# Patient Record
(demographics unavailable — no encounter records)

---

## 2025-07-02 NOTE — END OF VISIT
[FreeTextEntry3] : I, Tess Rowe, acted as a scribe on behalf of Dr. Nafisa Ji M.D. on 07/02/2025.   All medical entries made by the scribe were at my Dr. Nafisa iJ M.D. direction and personally dictated by me on 07/02/2025. I have reviewed the chart and agree that the record accurately reflects my personal performance of the history, physical exam, assessment and plan. I have also personally directed, reviewed, and agreed with the chart.  You can access the FollowMyHealth Patient Portal offered by [facility] by registering at the following website: [email or web address]. By joining [facility]’s FollowMyHealth portal, you will also be able to view your health information using other applications (apps) compatible with our system.

## 2025-07-02 NOTE — HISTORY OF PRESENT ILLNESS
[FreeTextEntry1] : 57 year old presents for annual visit. Pt has h/o TVH and h/o of CIN3. Pt went for breast biopsy that was discordant and has a meeting with Dr Castro later today.   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

## 2025-07-02 NOTE — PLAN
[FreeTextEntry1] : 57 year old presents for annual visit.   HCM -pap done today -breast mammo UTD -colonoscopy UTD -dexa UTD   RTO 1 year